# Patient Record
Sex: MALE | Race: BLACK OR AFRICAN AMERICAN | Employment: FULL TIME | URBAN - METROPOLITAN AREA
[De-identification: names, ages, dates, MRNs, and addresses within clinical notes are randomized per-mention and may not be internally consistent; named-entity substitution may affect disease eponyms.]

---

## 2023-12-05 ENCOUNTER — HOSPITAL ENCOUNTER (OUTPATIENT)
Dept: CV DIAGNOSTICS | Facility: HOSPITAL | Age: 33
Discharge: HOME | End: 2023-12-05
Attending: INTERNAL MEDICINE
Payer: COMMERCIAL

## 2023-12-05 ENCOUNTER — APPOINTMENT (OUTPATIENT)
Dept: CV DIAGNOSTICS | Age: 33
End: 2023-12-05
Attending: INTERNAL MEDICINE
Payer: COMMERCIAL

## 2023-12-05 DIAGNOSIS — R00.2 PALPITATIONS: ICD-10-CM

## 2023-12-05 DIAGNOSIS — R07.9 CHEST PAIN, UNSPECIFIED: ICD-10-CM

## 2023-12-05 DIAGNOSIS — R07.9 CHEST PAIN: ICD-10-CM

## 2023-12-05 PROCEDURE — 93306 TTE W/DOPPLER COMPLETE: CPT | Performed by: INTERNAL MEDICINE

## 2023-12-05 PROCEDURE — 93017 CV STRESS TEST TRACING ONLY: CPT | Performed by: INTERNAL MEDICINE

## 2024-10-03 ENCOUNTER — HOSPITAL ENCOUNTER (EMERGENCY)
Facility: HOSPITAL | Age: 34
Discharge: STILL ADMITTED OR ADMINISTRATIVE | End: 2024-10-03

## 2024-10-03 ENCOUNTER — HOSPITAL ENCOUNTER (EMERGENCY)
Facility: HOSPITAL | Age: 34
Discharge: HOME | End: 2024-10-04
Attending: EMERGENCY MEDICINE
Payer: COMMERCIAL

## 2024-10-03 DIAGNOSIS — E87.6 HYPOKALEMIA: ICD-10-CM

## 2024-10-03 DIAGNOSIS — F10.920 ALCOHOLIC INTOXICATION WITHOUT COMPLICATION (HCC): ICD-10-CM

## 2024-10-03 DIAGNOSIS — F32.A DEPRESSION, UNSPECIFIED DEPRESSION TYPE: Primary | ICD-10-CM

## 2024-10-03 LAB
ALBUMIN SERPL-MCNC: 4.4 G/DL (ref 3.2–4.8)
ALBUMIN/GLOB SERPL: 1.5 {RATIO} (ref 1–2)
ALP LIVER SERPL-CCNC: 61 U/L
ALT SERPL-CCNC: 16 U/L
AMPHET UR QL SCN: NEGATIVE
ANION GAP SERPL CALC-SCNC: 5 MMOL/L (ref 0–18)
APAP SERPL-MCNC: <2 UG/ML (ref 10–20)
AST SERPL-CCNC: 22 U/L (ref ?–34)
BASOPHILS # BLD AUTO: 0.04 X10(3) UL (ref 0–0.2)
BASOPHILS NFR BLD AUTO: 0.5 %
BENZODIAZ UR QL SCN: NEGATIVE
BILIRUB SERPL-MCNC: 0.5 MG/DL (ref 0.3–1.2)
BILIRUB UR QL STRIP.AUTO: NEGATIVE
BUN BLD-MCNC: 13 MG/DL (ref 9–23)
CALCIUM BLD-MCNC: 9.6 MG/DL (ref 8.7–10.4)
CANNABINOIDS UR QL SCN: NEGATIVE
CHLORIDE SERPL-SCNC: 108 MMOL/L (ref 98–112)
CLARITY UR REFRACT.AUTO: CLEAR
CO2 SERPL-SCNC: 24 MMOL/L (ref 21–32)
COCAINE UR QL: NEGATIVE
COLOR UR AUTO: COLORLESS
CREAT BLD-MCNC: 1.35 MG/DL
CREAT UR-SCNC: 19.1 MG/DL
EGFRCR SERPLBLD CKD-EPI 2021: 72 ML/MIN/1.73M2 (ref 60–?)
EOSINOPHIL # BLD AUTO: 0.08 X10(3) UL (ref 0–0.7)
EOSINOPHIL NFR BLD AUTO: 1 %
ERYTHROCYTE [DISTWIDTH] IN BLOOD BY AUTOMATED COUNT: 12.3 %
ETHANOL SERPL-MCNC: 256 MG/DL (ref ?–3)
FENTANYL UR QL SCN: NEGATIVE
GLOBULIN PLAS-MCNC: 3 G/DL (ref 2–3.5)
GLUCOSE BLD-MCNC: 103 MG/DL (ref 70–99)
GLUCOSE UR STRIP.AUTO-MCNC: NORMAL MG/DL
HCT VFR BLD AUTO: 40.5 %
HGB BLD-MCNC: 13.7 G/DL
IMM GRANULOCYTES # BLD AUTO: 0.01 X10(3) UL (ref 0–1)
IMM GRANULOCYTES NFR BLD: 0.1 %
KETONES UR STRIP.AUTO-MCNC: NEGATIVE MG/DL
LEUKOCYTE ESTERASE UR QL STRIP.AUTO: NEGATIVE
LYMPHOCYTES # BLD AUTO: 3.25 X10(3) UL (ref 1–4)
LYMPHOCYTES NFR BLD AUTO: 40.8 %
MCH RBC QN AUTO: 29.6 PG (ref 26–34)
MCHC RBC AUTO-ENTMCNC: 33.8 G/DL (ref 31–37)
MCV RBC AUTO: 87.5 FL
MDMA UR QL SCN: NEGATIVE
MONOCYTES # BLD AUTO: 0.64 X10(3) UL (ref 0.1–1)
MONOCYTES NFR BLD AUTO: 8 %
NEUTROPHILS # BLD AUTO: 3.95 X10 (3) UL (ref 1.5–7.7)
NEUTROPHILS # BLD AUTO: 3.95 X10(3) UL (ref 1.5–7.7)
NEUTROPHILS NFR BLD AUTO: 49.6 %
NITRITE UR QL STRIP.AUTO: NEGATIVE
OPIATES UR QL SCN: NEGATIVE
OSMOLALITY SERPL CALC.SUM OF ELEC: 284 MOSM/KG (ref 275–295)
OXYCODONE UR QL SCN: NEGATIVE
PH UR STRIP.AUTO: 5.5 [PH] (ref 5–8)
PLATELET # BLD AUTO: 212 10(3)UL (ref 150–450)
POTASSIUM SERPL-SCNC: 3.3 MMOL/L (ref 3.5–5.1)
PROT SERPL-MCNC: 7.4 G/DL (ref 5.7–8.2)
PROT UR STRIP.AUTO-MCNC: NEGATIVE MG/DL
RBC # BLD AUTO: 4.63 X10(6)UL
RBC UR QL AUTO: NEGATIVE
SALICYLATES SERPL-MCNC: <3 MG/DL (ref 3–20)
SARS-COV-2 RNA RESP QL NAA+PROBE: NOT DETECTED
SODIUM SERPL-SCNC: 137 MMOL/L (ref 136–145)
SP GR UR STRIP.AUTO: <1.005 (ref 1–1.03)
UROBILINOGEN UR STRIP.AUTO-MCNC: NORMAL MG/DL
WBC # BLD AUTO: 8 X10(3) UL (ref 4–11)

## 2024-10-03 PROCEDURE — 80307 DRUG TEST PRSMV CHEM ANLYZR: CPT | Performed by: EMERGENCY MEDICINE

## 2024-10-03 PROCEDURE — 80053 COMPREHEN METABOLIC PANEL: CPT | Performed by: EMERGENCY MEDICINE

## 2024-10-03 PROCEDURE — 80307 DRUG TEST PRSMV CHEM ANLYZR: CPT

## 2024-10-03 PROCEDURE — 80179 DRUG ASSAY SALICYLATE: CPT | Performed by: EMERGENCY MEDICINE

## 2024-10-03 PROCEDURE — 99285 EMERGENCY DEPT VISIT HI MDM: CPT

## 2024-10-03 PROCEDURE — 81003 URINALYSIS AUTO W/O SCOPE: CPT

## 2024-10-03 PROCEDURE — 36415 COLL VENOUS BLD VENIPUNCTURE: CPT

## 2024-10-03 PROCEDURE — 80053 COMPREHEN METABOLIC PANEL: CPT

## 2024-10-03 PROCEDURE — 82077 ASSAY SPEC XCP UR&BREATH IA: CPT

## 2024-10-03 PROCEDURE — 80179 DRUG ASSAY SALICYLATE: CPT

## 2024-10-03 PROCEDURE — 81003 URINALYSIS AUTO W/O SCOPE: CPT | Performed by: EMERGENCY MEDICINE

## 2024-10-03 PROCEDURE — 80143 DRUG ASSAY ACETAMINOPHEN: CPT

## 2024-10-03 PROCEDURE — 80143 DRUG ASSAY ACETAMINOPHEN: CPT | Performed by: EMERGENCY MEDICINE

## 2024-10-03 PROCEDURE — 85025 COMPLETE CBC W/AUTO DIFF WBC: CPT

## 2024-10-03 PROCEDURE — 85025 COMPLETE CBC W/AUTO DIFF WBC: CPT | Performed by: EMERGENCY MEDICINE

## 2024-10-03 PROCEDURE — 82077 ASSAY SPEC XCP UR&BREATH IA: CPT | Performed by: EMERGENCY MEDICINE

## 2024-10-04 VITALS
HEIGHT: 75 IN | DIASTOLIC BLOOD PRESSURE: 82 MMHG | HEART RATE: 89 BPM | TEMPERATURE: 98 F | RESPIRATION RATE: 18 BRPM | SYSTOLIC BLOOD PRESSURE: 128 MMHG | BODY MASS INDEX: 23.62 KG/M2 | OXYGEN SATURATION: 100 % | WEIGHT: 190 LBS

## 2024-10-04 RX ORDER — POTASSIUM CHLORIDE 1500 MG/1
40 TABLET, EXTENDED RELEASE ORAL ONCE
Status: COMPLETED | OUTPATIENT
Start: 2024-10-04 | End: 2024-10-04

## 2024-10-04 NOTE — CM/SW NOTE
Received a call from the pt's Rn requesting for a ride home for the pt. One time [employer] courtesy ride arranged. Ride details given to the Rn.

## 2024-10-04 NOTE — BH LEVEL OF CARE ASSESSMENT
Crisis Evaluation Assessment    Neftaly Luna YOB: 1990   Age 34 year old MRN [id number]   [facility] EMERGENCY DEPARTMENT Attending No att. providers found      Patient's legal sex: male  Patient identifies as: male  Patient's birth sex: male  Preferred pronouns: He/Him    Date of Service: 10/4/2024    Referral Source:  Referral Source  Where was crisis eval performed?: On-site  Referral Source: Friend/Relative  Referral Source Info: Pt's friend called 911 after pt sent concerning text messages    Reason for Crisis Evaluation   Patient accompanied to the ER with the police after a friend called and reported patient was sending concerning text messages.  Patient is denying SI/HI/AVH/SIB.  Patient reports he did send concerning text messages and did scratch his arm, which writer has observed and it is very superficial, and reports he was drinking and he was attention seeking.  Patient reports he had a disagreement with his wife to the point where she left the home with the kids and he was talking to a friend about all of this while drinking.  Patient's BAL upon arrival was 256.  Patient reports he was trying to get attention and someone he can talk to and someone to feel sorry for him and to be on his side but his friend ended up \"taking it to 4\" and calling the police.  Patient reports he wants to live for his kids and he wants to get to work tomorrow in the morning at 5 AM.  Patient reports he is always making poor choices when he is drinking which is why he never drinks and he even asked his psychiatrist for naltrexone to avoid drinking.  Patient reports in the past when he drank he is \"all over the place\" and acts \"crazy\" which is why he does not want to drink and should not drink.      Collateral  Writer observed screen shots of the text messages he sent to his friend by PD.  Messages to friend showed a picture of his forearm with one cut on it slightly bleeding.      Suicide Crisis  Syndrome:  Suicide Crisis Syndrome  Do you feel trapped with no good options left?: No  Are you overwhelmed, or have you lost control by negative thoughts filling your head? : No    Suicide Risk Screening:  Source of information for CSSR: Patient  In what setting is the screener performed?: in person  1. Have you wished you were dead or wished you could go to sleep and not wake up? (past 30 days): No  2. Have you actually had any thoughts of killing yourself? (past 30 days): No              6. Have you ever done anything, started to do anything, or prepared to do anything to end your life? (lifetime): No  7. How long ago did you do any of these?: Within the last three months  Score - BH OV: No Risk    Suicide Risk Assessments:  Suicidal Thoughts, Plan and Intent (this information to be used in conjunction with CSSR-S Suicide Screening)  Describe thoughts, ideation and intent:: Denies SI, reports he was attention seeking and is embarrassed  Frequency: How many times have you had these thoughts?: Less than once a week (Denies SI, reports he was attention seeking and is embarrassed)  Duration: When you have the thoughts, how long do they last?: Fleeting- a few seconds or minutes (Denies SI, reports he was attention seeking and is embarrassed)  Controllability: Could/can you stop thinking about killing yourself or wanting to die if you want to?: Easily able to control thoughts  Identify Risk Factors  Do you have access to lethal methods to attempt suicide?: Yes  Describe access to lethal methods: Household items, medications, sharps  Clinical Status:: Major depressive episode;Highly impulsive behavior;Substance abuse or dependence;Agitation or severe anxiety;Current Self-Injury  Activating Events/Recent Stressors:: Significant negative event(s) (legal, financial, relationship, etc.)  Identify Protective Factors  Internal: Identifies reasons for living;Fear of death or dying due to pain and suffering;Ability to cope with  stress;Frustration tolerance  External: Responsibility to family or others, living with family;Supportive social network of family or friends;Engaged in work or school  Risk Stratification  Risk Level: Low       Active thoughts: Patient denies.  Helpless/Hopeless/Worthless: Patient denies.  Significant losses: Patient denies.  Stressors: Patient reports the disagreement with his wife which resulted in her and the children leaving the home for the night and avoiding alcohol.      Non-Suicidal Self-Injury:   Cutting: Patient denies a history of cutting.  Patient reports that he did cut on his forearm 1 single cut.  Patient presents with a superficial scratch on his arm.  Patient reports he sent this to his friend and was hoping whatever void he had within him could be filled with attention but it ended up resulting in him coming here.      Risk to Others  Aggression/Agitation: Patient denies.  Destruction of property: Patient denies.  HI: Patient denies.      Access to Means:  Access to Means  Has access to means to attempt suicide, self-injure, harm others, or damage property?: Yes  Description of Access: Household items, medications, sharps  Discussion of Removal of Access to Means: To be discussed at discharge  Access to Firearm/Weapon: No  Do you have a firearm owner identification (FOID) card?: No      Protective Factors:   Patient reports his family.      Review of Psychiatric Systems:  Depression: Patient denies current depression.  Patient reports history which she sees a psychiatrist and a therapist for.  Patient reports he feels he has a good rapport with both and is able to work through things effectively.  Patient reports tonight was due to drinking when he knows he should not because he cannot control himself when he drinks.  Anxiety: Patient reports general anxiety.  Psychosis: Patient denies.  Sleep: Patient reports no issues with sleep.  Appetite: Patient reports no appetite  disturbances.      Substance Use:  Patient denies.  Patient reports he should not drink alcohol and usually does not and has asked his psychiatrist to be prescribed naltrexone to prevent him from drinking.  Patient denies history of a drinking problem but reports he knows how he acts when he is drinking which is why he avoids it at all costs but tonight they got the best of him.  Patient's BAL upon arrival was 256.  Patient's UDS was negative for all substances.   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  Home: Pt is able to perform own ADL's.  Patient denies any issues with motivation, completing daily tasks, paying bills, showering.      Ability to Care for Self::   See above.      Current Treatment and Treatment History:  Prior diagnoses:  -MDD  -MUNA    Inpatient hx:  -Patient denies    Outpatient hx:  -Patient denies    Therapist:  -Patient reports he does see a therapist regularly    Psychiatrist:  -Patient reports he does see a psychiatrist regularly    Medications:  -Patient reports he is prescribed antidepressants and is compliant with them      School/Work Performance:  See above.      Relevant Social History:  Family hx: Patient reports anxiety on his maternal side of the family  Trauma/Abuse hx: Patient denies  Marital status/Children: Patient reports he is  and has children  Living situation: Patient reports he resides with his wife and children  Legal hx: Patient denies  Supports: Patient reports his family      Flo and Complex (as applicable):  N/A    Current Medical (as applicable):  Current Medical  Medical Problems Under Current Treatment that will need to be continued after psychiatric admission: Pt denies  Do you have a Primary Care Physician?: No  Does the Patient Have: None  Active Eating Disorder: No    EDP Assessment (as applicable):  IBW Calculations  Weight: 190 lb  BMI (Calculated):  23.7  IBW LBS [place]: 196 LBS  IBW %: 96.94 %  IBW + 10%: 215.6 LBS  IBW - 10%: 176.4 LBS    Abuse Assessment:  Abuse Assessment  Physical Abuse: Denies  Verbal Abuse: Denies  Sexual Abuse: Denies  Neglect: Denies  Does anyone say or do something to you that makes you feel unsafe?: No  Have You Ever Been Harmed by a Partner/Caregiver?: No  Health Concerns r/t Abuse: No  Possible Abuse Reportable to:: Not appropriate for reporting to authorities    Mental Status Exam:   General Appearance  Characteristics: Appropriate clothing;Good hygiene  Eye Contact: Direct  Psychomotor Behavior  Gait/Movement: Normal;Steady;Coordinated  Abnormal movements: None  Posture: Relaxed  Rate of Movement: Normal  Mood and Affect  Mood or Feelings: Content;Calm;Regret  Appropriateness of Affect: Congruent to mood;Appropriate to situation  Range of Affect: Normal  Stability of Affect: Stable  Attitude toward staff: Co-operative;Open;Friendly  Speech  Rate of Speech: Appropriate  Flow of Speech: Appropriate  Intensity of Volume: Ordinary  Clarity: Clear  Cognition  Concentration: Unimpaired  Memory: Recent memory intact;Remote memory intact  Orientation Level: Oriented X4;Appropriate for developmental age  Insight: Good  Judgment: Fair  Fair/poor judgment as evidenced by: Patient sent concerning text messages that include SI/SIB for attention-seeking behaviors  Thought Patterns  Clarity/Relevance: Coherent;Logical;Relevant to topic  Flow: Organized  Content: Ordinary  Level of Consciousness: Alert  Level of Consciousness: Alert  Behavior  Exhibited behavior: Participated      Disposition:  Writer consulted with patient's attending ER physician who is in agreement with patient discharging home with a safety plan.    Rationale for Treatment Recommendation:   Patient is a 34-year-old male presenting to the ER after he sent concerning text messages to a friend while under the influence of alcohol.  Patient reports he had a bad argument with his  wife to the point where she and her children left.  Patient reports he was attention seeking and trying to get someone to feel sorry for him so he was texting her friend but the friend, after receiving the messages and a picture of his cut on his arm, called 911 for a wellbeing check.  Patient arrived via EMS with PD for an evaluation.  Patient denies SI/HI/AVH/SIB.  Patient reports he did have one cut that he made today to show for the attention and to have something to send in a picture form however denies any history of cutting and denies any urges to cut.  Patient reports no history of alcohol abuse but is knowledgeable of how he reacts when he does drink and usually does not.  Patient reports today he did drink and that everything is falling apart such as his relationship with his wife and now being here because of how he reacted.  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      Level of Care Recommendations  Consulted with: Dr. Kennedy  Level of Care Recommendation: Outpatient  Outpatient Criteria: Regular therapy needed;Support needed;Some increase in substance use  Outpatient Recommendations: Medication management;Therapy;Regular drug screens  Refused Treatment: No  Education Provided: Call 911 in an Emergency;Advised to call if condition worsens;Advised to call with questions;[facility] Crisis Line Number  Transferred: No  Sign-In  Patient Verbalized Understanding: Yes      Diagnoses with F-Codes:  Primary Psychiatric Diagnosis  F33.0 Major depressive disorder, recurrent episode, mild without psychotic features     Secondary Psychiatric Diagnoses  F41.1 Generalized anxiety disorder    Pervasive Diagnoses (as applicable)  Deferred  Pertinent Non-Psychiatric Diagnoses  Deferred        Haylee WOOTEN LPC

## 2024-10-04 NOTE — ED PROVIDER NOTES
Patient Seen in: [facility] Emergency Department      History     Chief Complaint   Patient presents with    Eval-P     Stated Complaint: eval p    Subjective:   Patient is a 34-year-old male presents emergency room for evaluation of SI.  Patient denies SI at this time he has no specific plan he reports he has been depressed because increased work stressors.  Patient states he does see a therapist he is on medications.  He takes his medications as prescribed.  Patient denies any SI HI auditory or visual hallucinations at this time.  He is very cooperative.    The history is provided by the patient and the police.     ED History source : EMS and police      Objective:     Past Medical History:    Depression              History reviewed. No pertinent surgical history.             Social History     Socioeconomic History    Marital status: Single   Tobacco Use    Smoking status: Every Day     Current packs/day: 0.50     Average packs/day: 0.5 packs/day for 2.8 years (1.4 ttl pk-yrs)     Types: Cigarettes     Start date: 2022    Smokeless tobacco: Never   Substance and Sexual Activity    Alcohol use: Yes     Comment: once in a week    Drug use: Never     Social Determinants of Health     Financial Resource Strain: Not on File (10/5/2022)    Received from [facility]    Financial Resource Strain     Financial Resource Strain: 0   Food Insecurity: Not on File (9/26/2024)    Received from [facility]    Food Insecurity     Food: 0   Transportation Needs: Not on File (10/5/2022)    Received from [facility]    Transportation Needs     Transportation: 0   Physical Activity: Not on File (10/5/2022)    Received from [facility]    Physical Activity     Physical Activity: 0   Stress: Not on File (10/5/2022)    Received from [facility]    Stress     Stress: 0   Social Connections: Not on File (9/13/2024)    Received from [facility]    Social Connections     Connectedness: 0   Housing Stability: Not on File (10/5/2022)     Received from [facility]    \[facility] [place] [facility]\"" Stability     Housin                  Physical Exam     ED Triage Vitals [10/03/24 2253]   /86   Pulse 94   Resp 18   Temp 97.9 °F (36.6 °C)   Temp src Oral   SpO2 98 %   O2 Device None (Room air)       Current Vitals:   Vital Signs  BP: 135/86  Pulse: 94  Resp: 18  Temp: 97.9 °F (36.6 °C)  Temp src: Oral    Oxygen Therapy  SpO2: 98 %  O2 Device: None (Room air)        Physical Exam  Vitals and nursing note reviewed.   Constitutional:       General: He is not in acute distress.     Appearance: Normal appearance. He is normal weight. He is not toxic-appearing.   HENT:      Head: Normocephalic and atraumatic.   Eyes:      Extraocular Movements: Extraocular movements intact.      Pupils: Pupils are equal, round, and reactive to light.   Cardiovascular:      Rate and Rhythm: Normal rate and regular rhythm.      Pulses: Normal pulses.   Pulmonary:      Effort: Pulmonary effort is normal.      Breath sounds: Normal breath sounds.   Abdominal:      General: Bowel sounds are normal. There is no distension.      Palpations: Abdomen is soft.      Tenderness: There is no abdominal tenderness.   Musculoskeletal:         General: Normal range of motion.   Skin:     General: Skin is warm.      Capillary Refill: Capillary refill takes less than 2 seconds.   Neurological:      General: No focal deficit present.      Mental Status: He is alert and oriented to person, place, and time.      Cranial Nerves: No cranial nerve deficit.      Sensory: No sensory deficit.   Psychiatric:         Mood and Affect: Mood normal.         Behavior: Behavior normal.             ED Course     Labs Reviewed   COMP METABOLIC PANEL (14) - Abnormal; Notable for the following components:       Result Value    Glucose 103 (*)     Potassium 3.3 (*)     Creatinine 1.35 (*)     All other components within normal limits   URINALYSIS WITH CULTURE REFLEX - Abnormal; Notable for the following components:    Urine  Color Colorless (*)     Spec Gravity <1.005 (*)     All other components within normal limits   ACETAMINOPHEN (TYLENOL), S - Abnormal; Notable for the following components:    Acetaminophen <2.0 (*)     All other components within normal limits   SALICYLATE, SERUM - Abnormal; Notable for the following components:    Salicylate <3.0 (*)     All other components within normal limits   ETHYL ALCOHOL - Abnormal; Notable for the following components:    Ethyl Alcohol 256 (*)     All other components within normal limits   RAPID SARS-COV-2 BY PCR - Normal   CBC WITH DIFFERENTIAL WITH PLATELET   DRUG SCREEN 8 W/OUT CONFIRMATION, URINE    Narrative:     Results of the Urine Drug Screen should be used only for medical purposes.                   MDM      Social -negative tobacco, patient etoh, negative drugs  Family History-noncontributory  Past Medical History-depression    Differential diagnosis before testing included depression, suicidal ideations, electrolyte abnormalities, alcohol intoxication    Co-morbidities that add to the complexity of management include: Depression, alcohol intoxication    Testing ordered during this visit included baseline labs for mental health evaluation    Radiographic images  None    External chart review showed review of Care Everywhere in epic system shows no related comorbidities to current presentation    History obtained by an independent source included from patient, EMS, police    Discussion of management with patient, mental health team    Social determinants of health that affect care include no listed primary care physician      Medications Provided: None    Course of Events during Emergency Room Visit include 34-year-old male presents emergency room for evaluation of SI statements.  Patient denies SI at this time.  He was drinking tonight he has been under a great deal of stress.  Patient reports forward thinking wanting to get out of the hospital before 6 AM so he can to be on  time for his job as he is worried he will get fired.  Patient be seen by mental health team.      Patient discussed with mental health team they feel the patient is okay to be discharged home will give a note for work.        Disposition:      Discharge  I have discussed with the patient the results of test, differential diagnosis, treatment plan, warning signs and symptoms which should prompt immediate return.  They expressed understanding of these instructions and agrees to the following plan provided.  They were given written discharge instructions and agrees to return for any concerns and voiced understanding and all questions were answered.         Medical Decision Making      Disposition and Plan     Clinical Impression:  1. Depression, unspecified depression type    2. Hypokalemia    3. Alcoholic intoxication without complication (HCC)         Disposition:  Discharge  10/4/2024 12:56 am    Follow-up:  Yaz Breaux MD  [address] [facility] [address]  [phone]    Schedule an appointment as soon as possible for a visit            Medications Prescribed:  Current Discharge Medication List              Supplementary Documentation:      Psychotropic medications were administered to the patient to prevent serious imminent harm to self or others. Behaviors, interventions, responses and restriction of rights are as documented in nursing notes.

## 2024-10-04 NOTE — ED INITIAL ASSESSMENT (HPI)
Brought by medic  accompanied by ssuy DOMINGO  with complaints of suicidal thoughts. Patient sent messages  about suicidal thoughts and  he tried to cut his right arm to end his life . Patient is calm and co-operative  denies any hallucinations. History of anxiety  and depression .

## (undated) NOTE — LETTER
Date & Time: 10/4/2024, 1:12 AM  Patient: Neftaly Luna  Encounter Provider(s):    Linnette Kennedy DO       To Whom It May Concern:    Neftaly Luna was seen and treated in our department on 10/3/2024. He should not return to work until Monday (10/7/2024)  .    If you have any questions or concerns, please do not hesitate to call.        _____________________________  Physician/APC Signature